# Patient Record
Sex: FEMALE | URBAN - METROPOLITAN AREA
[De-identification: names, ages, dates, MRNs, and addresses within clinical notes are randomized per-mention and may not be internally consistent; named-entity substitution may affect disease eponyms.]

---

## 2017-04-25 ENCOUNTER — APPOINTMENT (RX ONLY)
Dept: URBAN - METROPOLITAN AREA CLINIC 348 | Facility: CLINIC | Age: 37
Setting detail: DERMATOLOGY
End: 2017-04-25

## 2017-04-25 DIAGNOSIS — B07.8 OTHER VIRAL WARTS: ICD-10-CM

## 2017-04-25 PROCEDURE — ? TREATMENT REGIMEN

## 2017-04-25 PROCEDURE — ? LIQUID NITROGEN

## 2017-04-25 PROCEDURE — ? COUNSELING

## 2017-04-25 PROCEDURE — 17110 DESTRUCTION B9 LES UP TO 14: CPT

## 2017-04-25 ASSESSMENT — LOCATION SIMPLE DESCRIPTION DERM: LOCATION SIMPLE: LEFT THUMB

## 2017-04-25 ASSESSMENT — LOCATION DETAILED DESCRIPTION DERM: LOCATION DETAILED: PERIUNGUAL SKIN LEFT THUMB

## 2017-04-25 ASSESSMENT — LOCATION ZONE DERM: LOCATION ZONE: FINGER

## 2017-04-25 NOTE — HPI: WARTS ([id number])
Is This A New Presentation, Or A Follow-Up?: Wart
How Severe Are Your Warts?: moderate
Additional History: Patient states she has been filing the lesion trying to resolve it. She has not seen any improvement with at home treatment.

## 2017-04-25 NOTE — PROCEDURE: LIQUID NITROGEN
Medical Necessity Clause: This procedure was medically necessary because the lesions that were treated were:
Number Of Freeze-Thaw Cycles: 1 freeze-thaw cycle
Detail Level: Detailed
Render Post-Care Instructions In Note?: no
Consent: The patient's consent was obtained including but not limited to risks of crusting, scabbing, blistering, scarring, darker or lighter pigmentary change, recurrence, incomplete removal and infection.
Medical Necessity Information: It is in your best interest to select a reason for this procedure from the list below. All of these items fulfill various CMS LCD requirements except the new and changing color options.
Post-Care Instructions: I reviewed with the patient in detail post-care instructions. Patient is to wear sunprotection, and avoid picking at any of the treated lesions. Pt may apply Vaseline to crusted or scabbing areas.

## 2017-05-18 ENCOUNTER — APPOINTMENT (RX ONLY)
Dept: URBAN - METROPOLITAN AREA CLINIC 348 | Facility: CLINIC | Age: 37
Setting detail: DERMATOLOGY
End: 2017-05-18

## 2017-05-18 DIAGNOSIS — B07.8 OTHER VIRAL WARTS: ICD-10-CM | Status: STABLE

## 2017-05-18 PROCEDURE — ? COUNSELING

## 2017-05-18 PROCEDURE — 17110 DESTRUCTION B9 LES UP TO 14: CPT

## 2017-05-18 PROCEDURE — ? CANTHARIDIN

## 2017-05-18 PROCEDURE — ? TREATMENT REGIMEN

## 2017-05-18 ASSESSMENT — LOCATION ZONE DERM: LOCATION ZONE: FINGER

## 2017-05-18 ASSESSMENT — LOCATION DETAILED DESCRIPTION DERM: LOCATION DETAILED: PERIUNGUAL SKIN LEFT THUMB

## 2017-05-18 ASSESSMENT — LOCATION SIMPLE DESCRIPTION DERM: LOCATION SIMPLE: LEFT THUMB

## 2017-05-18 NOTE — PROCEDURE: CANTHARIDIN
Curette Text: Prior to application of cantharidin the lesions were lightly pared with a curette.
Include Z78.9 (Other Specified Conditions Influencing Health Status) As An Associated Diagnosis?: No
Medical Necessity Information: It is in your best interest to select a reason for this procedure from the list below. All of these items fulfill various CMS LCD requirements except the new and changing color options.
Medical Necessity Clause: This procedure was medically necessary because the lesions that were treated were:
Strength: [facility]
Post-Care Instructions: I reviewed with the patient in detail post-care instructions. The patient understands that the treated areas should be washed off 2 hours after application.
Consent: The patient's consent was obtained including but not limited to risks of crusting, scabbing, scarring, blistering, darker or lighter pigmentary change, recurrence, incomplete removal and infection.
Detail Level: Detailed

## 2017-06-23 ENCOUNTER — APPOINTMENT (RX ONLY)
Dept: URBAN - METROPOLITAN AREA CLINIC 348 | Facility: CLINIC | Age: 37
Setting detail: DERMATOLOGY
End: 2017-06-23

## 2017-06-23 DIAGNOSIS — B07.8 OTHER VIRAL WARTS: ICD-10-CM | Status: IMPROVED

## 2017-06-23 PROCEDURE — ? COUNSELING

## 2017-06-23 PROCEDURE — 17110 DESTRUCTION B9 LES UP TO 14: CPT

## 2017-06-23 PROCEDURE — ? TREATMENT REGIMEN

## 2017-06-23 PROCEDURE — ? LIQUID NITROGEN

## 2017-06-23 ASSESSMENT — LOCATION DETAILED DESCRIPTION DERM: LOCATION DETAILED: PERIUNGUAL SKIN LEFT THUMB

## 2017-06-23 ASSESSMENT — LOCATION SIMPLE DESCRIPTION DERM: LOCATION SIMPLE: LEFT THUMB

## 2017-06-23 ASSESSMENT — LOCATION ZONE DERM: LOCATION ZONE: FINGER

## 2017-06-23 NOTE — PROCEDURE: LIQUID NITROGEN
Post-Care Instructions: I reviewed with the patient in detail post-care instructions. Patient is to wear sunprotection, and avoid picking at any of the treated lesions. Pt may apply Vaseline to crusted or scabbing areas.
Medical Necessity Information: It is in your best interest to select a reason for this procedure from the list below. All of these items fulfill various CMS LCD requirements except the new and changing color options.
Medical Necessity Clause: This procedure was medically necessary because the lesions that were treated were:
Include Z78.9 (Other Specified Conditions Influencing Health Status) As An Associated Diagnosis?: No
Consent: The patient's consent was obtained including but not limited to risks of crusting, scabbing, blistering, scarring, darker or lighter pigmentary change, recurrence, incomplete removal and infection.
Detail Level: Detailed
Number Of Freeze-Thaw Cycles: 1 freeze-thaw cycle
Duration Of Freeze Thaw-Cycle (Seconds): 5-10